# Patient Record
Sex: MALE | Race: WHITE | NOT HISPANIC OR LATINO | Employment: OTHER | URBAN - METROPOLITAN AREA
[De-identification: names, ages, dates, MRNs, and addresses within clinical notes are randomized per-mention and may not be internally consistent; named-entity substitution may affect disease eponyms.]

---

## 2018-05-10 NOTE — PATIENT DISCUSSION
MYOPIA OU: PRESCRIBED GLASSES AND CONTACTS. CALL/RTC IF NOTING DIFFICULTY WITH COMFORT OR VISION IN NEW CLS. FOLLOW-UP AS SCHEDULED.

## 2020-06-22 NOTE — PATIENT DISCUSSION
MYOPIA OU: PRESCRIBED GLASSES AND CONTACTS. DISCUSSED LASIK SURGERY AND RECOMMENDED DR. COLEMAN IF INTERESTED. FOLLOW-UP AS SCHEDULED.

## 2020-06-22 NOTE — PATIENT DISCUSSION
CONJUNCTIVITIS (ALLERGIC), OU:  PRESCRIBE PATADAY QAM/PRN. WILL SWITCH CLS TO DAILY LENSES   RETURN FOR FOLLOW-UP AS SCHEDULED.

## 2022-03-25 NOTE — PATIENT DISCUSSION
Discussed alternative refractive options including LASIK- will call back if desires consult with Dr. Graham Cornejo.

## 2022-03-25 NOTE — PATIENT DISCUSSION
Artificial Tears BID/PRN. Continue with daily CLs and recommended giving eyes a break from time to time. Excessive computer use- recommended taking breaks and looking away from screen.

## 2022-03-30 ENCOUNTER — NEW PATIENT (OUTPATIENT)
Dept: URBAN - METROPOLITAN AREA CLINIC 32 | Facility: CLINIC | Age: 87
End: 2022-03-30

## 2022-03-30 DIAGNOSIS — H35.363: ICD-10-CM

## 2022-03-30 DIAGNOSIS — Z96.1: ICD-10-CM

## 2022-03-30 DIAGNOSIS — H16.223: ICD-10-CM

## 2022-03-30 PROCEDURE — 92015 DETERMINE REFRACTIVE STATE: CPT

## 2022-03-30 PROCEDURE — 92002 INTRM OPH EXAM NEW PATIENT: CPT

## 2022-03-30 ASSESSMENT — VISUAL ACUITY
OD_SC: J10
OS_SC: J10
OD_SC: 20/40-2
OS_SC: 20/60
OD_GLARE: 20/400
OS_GLARE: 20/400

## 2022-03-30 ASSESSMENT — KERATOMETRY
OS_K2POWER_DIOPTERS: 42.50
OD_K2POWER_DIOPTERS: 42.00
OD_AXISANGLE_DEGREES: 20
OD_K1POWER_DIOPTERS: 44.50
OD_AXISANGLE2_DEGREES: 110
OS_AXISANGLE_DEGREES: 165
OS_AXISANGLE2_DEGREES: 75
OS_K1POWER_DIOPTERS: 44.50

## 2022-03-30 ASSESSMENT — TONOMETRY
OS_IOP_MMHG: 15
OD_IOP_MMHG: 15

## 2022-12-13 ENCOUNTER — EMERGENCY VISIT (OUTPATIENT)
Dept: URBAN - METROPOLITAN AREA CLINIC 32 | Facility: CLINIC | Age: 87
End: 2022-12-13

## 2022-12-13 PROCEDURE — 92134 CPTRZ OPH DX IMG PST SGM RTA: CPT

## 2022-12-13 PROCEDURE — 99214 OFFICE O/P EST MOD 30 MIN: CPT

## 2022-12-13 ASSESSMENT — KERATOMETRY
OD_K1POWER_DIOPTERS: 44.50
OS_AXISANGLE2_DEGREES: 75
OD_AXISANGLE_DEGREES: 20
OD_K2POWER_DIOPTERS: 42.00
OD_AXISANGLE2_DEGREES: 110
OS_K1POWER_DIOPTERS: 44.50
OS_K2POWER_DIOPTERS: 42.50
OS_AXISANGLE_DEGREES: 165

## 2022-12-13 ASSESSMENT — VISUAL ACUITY
OD_SC: 20/70
OS_SC: 20/70+2

## 2022-12-13 ASSESSMENT — TONOMETRY
OS_IOP_MMHG: 16
OD_IOP_MMHG: 15
